# Patient Record
Sex: MALE | Race: WHITE
[De-identification: names, ages, dates, MRNs, and addresses within clinical notes are randomized per-mention and may not be internally consistent; named-entity substitution may affect disease eponyms.]

---

## 2017-12-17 ENCOUNTER — HOSPITAL ENCOUNTER (EMERGENCY)
Dept: HOSPITAL 26 - MED | Age: 52
Discharge: HOME | End: 2017-12-17
Payer: COMMERCIAL

## 2017-12-17 VITALS — SYSTOLIC BLOOD PRESSURE: 130 MMHG | DIASTOLIC BLOOD PRESSURE: 86 MMHG

## 2017-12-17 VITALS — BODY MASS INDEX: 28.61 KG/M2 | WEIGHT: 178 LBS | HEIGHT: 66 IN

## 2017-12-17 VITALS — DIASTOLIC BLOOD PRESSURE: 93 MMHG | SYSTOLIC BLOOD PRESSURE: 121 MMHG

## 2017-12-17 DIAGNOSIS — R55: ICD-10-CM

## 2017-12-17 DIAGNOSIS — M54.12: Primary | ICD-10-CM

## 2017-12-17 DIAGNOSIS — J45.909: ICD-10-CM

## 2017-12-17 DIAGNOSIS — M62.838: ICD-10-CM

## 2017-12-17 PROCEDURE — 99284 EMERGENCY DEPT VISIT MOD MDM: CPT

## 2017-12-17 PROCEDURE — 73030 X-RAY EXAM OF SHOULDER: CPT

## 2017-12-17 PROCEDURE — 93005 ELECTROCARDIOGRAM TRACING: CPT

## 2017-12-17 PROCEDURE — 96372 THER/PROPH/DIAG INJ SC/IM: CPT

## 2020-03-14 ENCOUNTER — HOSPITAL ENCOUNTER (EMERGENCY)
Dept: HOSPITAL 1 - ED | Age: 55
Discharge: HOME | End: 2020-03-14
Payer: COMMERCIAL

## 2020-03-14 VITALS — SYSTOLIC BLOOD PRESSURE: 127 MMHG | DIASTOLIC BLOOD PRESSURE: 90 MMHG

## 2020-03-14 VITALS
BODY MASS INDEX: 27.54 KG/M2 | WEIGHT: 171.37 LBS | BODY MASS INDEX: 27.54 KG/M2 | HEIGHT: 66 IN | HEIGHT: 66 IN | WEIGHT: 171.37 LBS

## 2020-03-14 DIAGNOSIS — Z88.8: ICD-10-CM

## 2020-03-14 DIAGNOSIS — Z88.5: ICD-10-CM

## 2020-03-14 DIAGNOSIS — E11.9: ICD-10-CM

## 2020-03-14 DIAGNOSIS — K59.00: ICD-10-CM

## 2020-03-14 DIAGNOSIS — G43.909: ICD-10-CM

## 2020-03-14 DIAGNOSIS — J45.909: ICD-10-CM

## 2020-03-14 DIAGNOSIS — K21.9: Primary | ICD-10-CM

## 2020-03-14 DIAGNOSIS — I10: ICD-10-CM

## 2020-03-14 LAB
ALBUMIN SERPL-MCNC: 3.4 G/DL (ref 3.4–5)
ALP SERPL-CCNC: 94 U/L (ref 46–116)
ALT SERPL-CCNC: 17 U/L (ref 16–63)
AST SERPL-CCNC: 14 U/L (ref 15–37)
BASOPHILS NFR BLD: 0.5 % (ref 0–2)
BILIRUB SERPL-MCNC: 0.23 MG/DL (ref 0.2–1)
BUN SERPL-MCNC: 17 MG/DL (ref 7–18)
CALCIUM SERPL-MCNC: 8.2 MG/DL (ref 8.5–10.1)
CHLORIDE SERPL-SCNC: 109 MMOL/L (ref 98–107)
CO2 SERPL-SCNC: 21.8 MMOL/L (ref 21–32)
CREAT SERPL-MCNC: 1.1 MG/DL (ref 0.7–1.3)
ERYTHROCYTE [DISTWIDTH] IN BLOOD BY AUTOMATED COUNT: 16.8 % (ref 11.5–14.5)
GFR SERPLBLD BASED ON 1.73 SQ M-ARVRAT: > 60 ML/MIN
GLUCOSE SERPL-MCNC: 118 MG/DL (ref 74–106)
PLATELET # BLD: 243 X10^3MCL (ref 130–400)
POTASSIUM SERPL-SCNC: 3.6 MMOL/L (ref 3.5–5.1)
PROT SERPL-MCNC: 7.1 G/DL (ref 6.4–8.2)
SODIUM SERPL-SCNC: 141 MMOL/L (ref 136–145)

## 2020-10-17 ENCOUNTER — HOSPITAL ENCOUNTER (INPATIENT)
Dept: HOSPITAL 1 - ED | Age: 55
LOS: 3 days | Discharge: HOME | DRG: 233 | End: 2020-10-20
Payer: COMMERCIAL

## 2020-10-17 VITALS
WEIGHT: 170.56 LBS | HEIGHT: 66 IN | HEIGHT: 66 IN | BODY MASS INDEX: 27.41 KG/M2 | BODY MASS INDEX: 27.41 KG/M2 | WEIGHT: 170.56 LBS

## 2020-10-17 VITALS — DIASTOLIC BLOOD PRESSURE: 76 MMHG | SYSTOLIC BLOOD PRESSURE: 114 MMHG

## 2020-10-17 VITALS — SYSTOLIC BLOOD PRESSURE: 121 MMHG | DIASTOLIC BLOOD PRESSURE: 84 MMHG

## 2020-10-17 DIAGNOSIS — Z80.42: ICD-10-CM

## 2020-10-17 DIAGNOSIS — Z79.899: ICD-10-CM

## 2020-10-17 DIAGNOSIS — E87.6: ICD-10-CM

## 2020-10-17 DIAGNOSIS — K35.32: Primary | ICD-10-CM

## 2020-10-17 DIAGNOSIS — Z88.8: ICD-10-CM

## 2020-10-17 DIAGNOSIS — E11.9: ICD-10-CM

## 2020-10-17 DIAGNOSIS — F43.10: ICD-10-CM

## 2020-10-17 DIAGNOSIS — J45.909: ICD-10-CM

## 2020-10-17 DIAGNOSIS — G43.909: ICD-10-CM

## 2020-10-17 DIAGNOSIS — I10: ICD-10-CM

## 2020-10-17 DIAGNOSIS — Z88.5: ICD-10-CM

## 2020-10-17 DIAGNOSIS — F31.9: ICD-10-CM

## 2020-10-17 DIAGNOSIS — Z81.8: ICD-10-CM

## 2020-10-17 LAB
ALBUMIN SERPL-MCNC: 3.6 G/DL (ref 3.4–5)
ALP SERPL-CCNC: 105 U/L (ref 46–116)
ALT SERPL-CCNC: 35 U/L (ref 16–63)
AST SERPL-CCNC: 19 U/L (ref 15–37)
BASOPHILS NFR BLD: 0.1 % (ref 0–2)
BILIRUB SERPL-MCNC: 0.5 MG/DL (ref 0.2–1)
BUN SERPL-MCNC: 11 MG/DL (ref 7–18)
CALCIUM SERPL-MCNC: 8.1 MG/DL (ref 8.5–10.1)
CHLORIDE SERPL-SCNC: 105 MMOL/L (ref 98–107)
CO2 SERPL-SCNC: 20.9 MMOL/L (ref 21–32)
CREAT SERPL-MCNC: 1 MG/DL (ref 0.7–1.3)
ERYTHROCYTE [DISTWIDTH] IN BLOOD BY AUTOMATED COUNT: 19 % (ref 11.5–14.5)
GFR SERPLBLD BASED ON 1.73 SQ M-ARVRAT: > 60 ML/MIN
GLUCOSE SERPL-MCNC: 129 MG/DL (ref 74–106)
LIPASE SERPL-CCNC: 69 IU/L (ref 73–393)
MICROSCOPIC UR-IMP: NO
PLATELET # BLD: 211 X10^3MCL (ref 130–400)
POTASSIUM SERPL-SCNC: 3.6 MMOL/L (ref 3.5–5.1)
PROT SERPL-MCNC: 7.3 G/DL (ref 6.4–8.2)
RBC # UR STRIP.AUTO: NEGATIVE /UL
SODIUM SERPL-SCNC: 139 MMOL/L (ref 136–145)
UA SPECIFIC GRAVITY: 1.01 (ref 1–1.03)

## 2020-10-17 PROCEDURE — G0378 HOSPITAL OBSERVATION PER HR: HCPCS

## 2020-10-17 PROCEDURE — 0DTJ4ZZ RESECTION OF APPENDIX, PERCUTANEOUS ENDOSCOPIC APPROACH: ICD-10-PCS | Performed by: SURGERY

## 2020-10-17 NOTE — NUR
PT ARRIVED TO ED [facility] WITH COMPLAINTS OF RIGHT SIDED ABD PAIN THAT STARTED LAST
NIGHT AT 2000. PT STATES PAIN IS NOT RADIATING AND INCREASES WITH PALPATION. PT
STATES ALSO FELT NAUSEATED LAST NIGHT AND VOMITED X 1 TIME. PT LBM WAS
YESTERDAY AT 1800. PT STATES PAIN AT THIS TIME IS 10/10. PER EMT, PT CALLED
AMBULANCE THIS AM BECAUSE PAIN WAS NOT GOING AWAY. PT WAS GIVEN ZOFRAN 4MG PO
BY FIRE AND PT STATES NOT LONGER FEELING NAUSEATED AT THIS TIME. PT ARRIVED
LOOKING TO BE IN PAIN, PT AMBULATED FROM GURNEY TO BED WITHOUT ASSISTANCE. PT
CHANGED INTO GOWN. RESPIRATIONS EVEN AND UNLABORED ON ROOM AIR. CALL LIGHT
WITHIN REACH. WILL CONTINUE TO MONITOR.

## 2020-10-17 NOTE — NUR
RECEIVED PT FROM DAY SHIFT NURSE. PT A/OX4. ABLE TO MAKE NEEDS KNOWN. SPEECH
CLEAR. ON TELE#16 READING SINUS TACHYCARDIA . PT DENIES CHEST PAIN OR
PRESSURE AT THIS TIME. PULSES PALPABLE. NO EDEMA NOTED. RR EVEN AND UNLABORED
ON RA. PT C/O NAUSEA AND R ABDOMEN PAIN, WILL PAGE DOCTOR FOR NEW ORDERS.
VOIDS FREELY.  NO WEAKNESS NOTED. SKIN INTACT. IV TO R FINGER AND LIJ CENTRAL
LINE INTACT WITH 3 PORTS. CALL LIGHT WITHIN REACH. WILL CONTINUE TO MONITOR.

## 2020-10-17 NOTE — NUR
NEW IV STARTED TO 2ND DIGIT TO LEFT HAND AFTER MULTIPLE ATTEMPTS. 24 GAUGE
FLUSHING FREELY. IV FLUIDS AND ANTIBIOTICS INFUSING AT THIS TIME. WILL CONITNUE
TO MONITOR.

## 2020-10-17 NOTE — NUR
IV FLUIDS NOT INFUSING TO RIGHT EJ, YVONNEITPLE ATTEMPTS TO REPOSITION BY NURSE
AND DR. DOWLING AND UNSUCCESSFUL. PT STATES COMMONLY NEEDS CENTRAL OR PICC LINE.
DR. DOWLING AWARE. AWAITING ULTRASOUND TO ASSIST WITH CENTRAL LINE PLACEMENT

## 2020-10-17 NOTE — NUR
PT WAS RECEIVED BY PRIMARY NURSE RAMÓN FROM ED [facility] AT 1455H. AAOX4.
DENIES HEADACHE/DIZZINESS. ABLE TO FOLLOW COMMANDS. NO SOB NOTED, LUNG SOUNDS
CTA. DENIES CHEST PAIN/PRESSURE, SINUS TACHYCARDIA ON THE MONITOR, HR .
NO EDEMA NOTED. C/O 7/10 RIGHT ABDOMINAL PAIN DESCRIBED AS STABBING AND
NAUSEA, STATED THAT HE WAS VOMITING YESTERDAY. LAST BM=10/16/20 (FORMED).
BOWEL SOUNDS ACTIVE. ABDOMEN IS SOFT AND ROUND. VOIDS. IV SITE PATENT AND
INTACT ON THE LEFT FINGER AND TRIPLE LUMEN CENTRAL LINE ON THE LIJ (W/ GOOD
BLOOD RETURN). SIDE RAILS UPX2. CALL LIGHT ON REACH. ENDORSED TO PRIMARY NURSE
RAMÓN FOR CONTINUITY OF CARE

## 2020-10-17 NOTE — NUR
ULTRASOUND GUIDED CENTRAL LINE PLACEMENT SUCCESSFUL. PT TOLERATED WELL. 
INFORMED [facility] NURSE. AWATING X RAY VERIFICATION

## 2020-10-18 VITALS — DIASTOLIC BLOOD PRESSURE: 86 MMHG | SYSTOLIC BLOOD PRESSURE: 130 MMHG

## 2020-10-18 VITALS — DIASTOLIC BLOOD PRESSURE: 82 MMHG | SYSTOLIC BLOOD PRESSURE: 126 MMHG

## 2020-10-18 VITALS — SYSTOLIC BLOOD PRESSURE: 127 MMHG | DIASTOLIC BLOOD PRESSURE: 81 MMHG

## 2020-10-18 VITALS — SYSTOLIC BLOOD PRESSURE: 131 MMHG | DIASTOLIC BLOOD PRESSURE: 81 MMHG

## 2020-10-18 VITALS — DIASTOLIC BLOOD PRESSURE: 80 MMHG | SYSTOLIC BLOOD PRESSURE: 122 MMHG

## 2020-10-18 VITALS — DIASTOLIC BLOOD PRESSURE: 71 MMHG | SYSTOLIC BLOOD PRESSURE: 158 MMHG

## 2020-10-18 VITALS — SYSTOLIC BLOOD PRESSURE: 123 MMHG | DIASTOLIC BLOOD PRESSURE: 83 MMHG

## 2020-10-18 VITALS — SYSTOLIC BLOOD PRESSURE: 113 MMHG | DIASTOLIC BLOOD PRESSURE: 82 MMHG

## 2020-10-18 VITALS — SYSTOLIC BLOOD PRESSURE: 122 MMHG | DIASTOLIC BLOOD PRESSURE: 83 MMHG

## 2020-10-18 VITALS — SYSTOLIC BLOOD PRESSURE: 139 MMHG | DIASTOLIC BLOOD PRESSURE: 93 MMHG

## 2020-10-18 VITALS — SYSTOLIC BLOOD PRESSURE: 119 MMHG | DIASTOLIC BLOOD PRESSURE: 80 MMHG

## 2020-10-18 LAB
BASOPHILS NFR BLD: 0.1 % (ref 0–2)
BUN SERPL-MCNC: 14 MG/DL (ref 7–18)
CALCIUM SERPL-MCNC: 7.3 MG/DL (ref 8.5–10.1)
CHLORIDE SERPL-SCNC: 108 MMOL/L (ref 98–107)
CO2 SERPL-SCNC: 23.5 MMOL/L (ref 21–32)
CREAT SERPL-MCNC: 1.1 MG/DL (ref 0.7–1.3)
ERYTHROCYTE [DISTWIDTH] IN BLOOD BY AUTOMATED COUNT: 19.4 % (ref 11.5–14.5)
GFR SERPLBLD BASED ON 1.73 SQ M-ARVRAT: > 60 ML/MIN
GLUCOSE SERPL-MCNC: 137 MG/DL (ref 74–106)
MAGNESIUM SERPL-MCNC: 2.1 MG/DL (ref 1.8–2.4)
PHOSPHATE SERPL-MCNC: 1.9 MG/DL (ref 2.5–4.9)
PLATELET # BLD: 191 X10^3MCL (ref 130–400)
POTASSIUM SERPL-SCNC: 3.9 MMOL/L (ref 3.5–5.1)
SODIUM SERPL-SCNC: 140 MMOL/L (ref 136–145)

## 2020-10-18 NOTE — NUR
REPORT TAKEN FROM NIGHT SHIFT NURSE AT THE BEDSIDE, PATIENT FOUND TO BE AWAKE
AND ALERT. PATIENT DENIED ACUTE DISTRESS AT THIS TIME, I OBSERVED 2 LARGE
DRESSING TO ABD, SOME DRAINAGE NOTED TO RIGHT ABD DRESSING, BUT NOT SOILED
THROUGH. CENTRAL LINE TO LEFT IJ INTACT AND SECURED. WILL CONTINUE TO MONITOR

## 2020-10-18 NOTE — NUR
PT RESTING IN BED AT THIS TIME. NO SIGNS OF ACUTE DISTRESS NOTED. RR EVEN AND
UNLABORED ON RA. PT C/O 7/10 PAIN, MEDICATED PER MAR. PT HAS 3 INCISIONS TO
ABD, COVERED WITH DRSG. ALL NEEDS/CONCERNS ADDRESSED THROUGHOUT THE SHIFT.
CALL LIGHT WITHIN REACH. WILL ENDORSE CARE TO ONCOMING SHIFT NURSE.

## 2020-10-18 NOTE — NUR
RECEIVED PT BACK FROM OR. PT HAD A LAPAROSCOPIC APPENDECTOMY SURGERY. PT HAS 3
ABD INCISIONS WITH SUTURES AND STAPLES CYN. PT REPORTED THAT HE IS DIABETIC,
RECENT . A/OX4. ABLE TO MAKE NEEDS KNOWN. VITAL SIGNS UPON ARRIVAL: BP
122/80 (94), , OXYGEN 92 ON RA, TEMP 99.1, RR 16, PAIN 3/10. PT STABLE.
WILL CONTINUE TO MONITOR.

## 2020-10-18 NOTE — NUR
RECEIVED REPORT FROM DAY SHIFT NURSE. PT SEEN GETTING OUT OF BED TO USE THE
RESTROOM. A/OX4. ABLE TO MAKE NEEDS KNOWN. SPEECH CLEAR. ON TELE#16 READING
SINUS TACHYCARDIA . PT DENIES CHEST PAIN OR PRESSURE. RR EVEN AND
UNLABORED ON RA. PT DENIES SOB OR DIFFICULTY BREATHING. PT REPORTED SMALL SOFT
BOWEL MOVEMENT TODAY. VOIDS FREELY. AMBULATORY. PT HAS 3 ABD INCISION WITH
SUTURES AND KACI, DRG CDI. NO C/O PAIN OR DISCOMFORT AT THIS TIME. IV
CENTRAL LINE TO LIJ, PATENT AND REINFORCED WITH TAPE. CALL LIGHT WITHIN REACH.
BED IN LOWEST POSITION. WILL CONTINUE TO MONITOR.

## 2020-10-19 VITALS — DIASTOLIC BLOOD PRESSURE: 93 MMHG | SYSTOLIC BLOOD PRESSURE: 142 MMHG

## 2020-10-19 VITALS — SYSTOLIC BLOOD PRESSURE: 152 MMHG | DIASTOLIC BLOOD PRESSURE: 95 MMHG

## 2020-10-19 VITALS — DIASTOLIC BLOOD PRESSURE: 102 MMHG | SYSTOLIC BLOOD PRESSURE: 163 MMHG

## 2020-10-19 VITALS — SYSTOLIC BLOOD PRESSURE: 143 MMHG | DIASTOLIC BLOOD PRESSURE: 98 MMHG

## 2020-10-19 VITALS — SYSTOLIC BLOOD PRESSURE: 148 MMHG | DIASTOLIC BLOOD PRESSURE: 98 MMHG

## 2020-10-19 VITALS — DIASTOLIC BLOOD PRESSURE: 88 MMHG | SYSTOLIC BLOOD PRESSURE: 139 MMHG

## 2020-10-19 VITALS — SYSTOLIC BLOOD PRESSURE: 122 MMHG | DIASTOLIC BLOOD PRESSURE: 88 MMHG

## 2020-10-19 LAB
BASOPHILS NFR BLD: 0.3 % (ref 0–2)
BUN SERPL-MCNC: 7 MG/DL (ref 7–18)
CALCIUM SERPL-MCNC: 7.9 MG/DL (ref 8.5–10.1)
CHLORIDE SERPL-SCNC: 106 MMOL/L (ref 98–107)
CO2 SERPL-SCNC: 23.3 MMOL/L (ref 21–32)
CREAT SERPL-MCNC: 0.8 MG/DL (ref 0.7–1.3)
ERYTHROCYTE [DISTWIDTH] IN BLOOD BY AUTOMATED COUNT: 19.4 % (ref 11.5–14.5)
GFR SERPLBLD BASED ON 1.73 SQ M-ARVRAT: > 60 ML/MIN
GLUCOSE SERPL-MCNC: 100 MG/DL (ref 74–106)
MAGNESIUM SERPL-MCNC: 1.8 MG/DL (ref 1.8–2.4)
PHOSPHATE SERPL-MCNC: 1.7 MG/DL (ref 2.5–4.9)
PLATELET # BLD: 169 X10^3MCL (ref 130–400)
POTASSIUM SERPL-SCNC: 3 MMOL/L (ref 3.5–5.1)
SODIUM SERPL-SCNC: 135 MMOL/L (ref 136–145)

## 2020-10-19 NOTE — NUR
Patient c/o abd pain at surgical sites, dilaudid givenx2 with moderate relief.
Dr. Guevara and Dr. Pitt made aware of uncontrolled pain with IV meds. Warm
packs given to facilitate pain management. Unable to advance diet from clear
liquids, patient complaining of nausea, zofran givenx1 on this shift. K3.0,
covered with 20meq Kcl IV. Per pharmacy, Hugo,
hold second bag prn order after labs
drawn tomorrow am. Phos 1.7, sodium phosphate IV given as well. Dressings
intact. All needs tended to

## 2020-10-19 NOTE — NUR
PT ASLEEP IN BED AT THIS TIME. NO SIGNS OF ACUTE DISTRESS NOTED. CALL LIGHT
WITHIN REACH. WILL CONTINUE TO MONITOR.

## 2020-10-19 NOTE — NUR
PT ASLEEP AT THIS TIME, EASILY AROUSABLE. PT C/O PAIN AT THIS TIME, MEDICATED
PER MAR. NO SIGNS OF ACUTE DISTRESS NOTED. VITAL SIGNS WNL. WILL ENDORSE CARE
TO ONCOMING SHIFT NURSE.

## 2020-10-19 NOTE — NUR
Assumed care from night shift nurse. Patient is asleep at change of shift. No
signs of pain or distress, fall and safety precautions in place. Hourly
rounding initiated, will continue to monitor

## 2020-10-19 NOTE — NUR
PATIENT RESTING IN BED, A/O X4, VERBALLY RESPONSIVE. BREATHING E/U ON ROOM
AIR, SPO2 98%, DENIES SOB. TELE #16, SINUS RHYTHM, HR 91, DENIES CHEST PAIN.
ABD SOFT AND ROUND, S/P LAP APPENDECTOMY, X2 GAUZE BANDAGES, CDI, C/O OF 8/10
ABD PAIN, WILL GIVE DILAUDID PRN PER EMAR. C/O OF NAUSEA, DENIES VOMITTING,
PROVIDED EMESIS BAG @ BEDSIDE AND WILL GIVE ZOFRAN PRN. NO EDEMA NOTED.
CENTRAL LINE TO [facility], INTACT, FLUSHED AND INFUSING NS @ 80 ML/HR. CALL LIGHT
WITHIN REACH, BED IN LOWEST POSITION. WILL CONTINUE TO MONITOR.

## 2020-10-20 VITALS — SYSTOLIC BLOOD PRESSURE: 150 MMHG | DIASTOLIC BLOOD PRESSURE: 102 MMHG

## 2020-10-20 VITALS — DIASTOLIC BLOOD PRESSURE: 102 MMHG | SYSTOLIC BLOOD PRESSURE: 150 MMHG

## 2020-10-20 VITALS — SYSTOLIC BLOOD PRESSURE: 154 MMHG | DIASTOLIC BLOOD PRESSURE: 96 MMHG

## 2020-10-20 VITALS — SYSTOLIC BLOOD PRESSURE: 147 MMHG | DIASTOLIC BLOOD PRESSURE: 97 MMHG

## 2020-10-20 LAB
BASOPHILS NFR BLD: 0.1 % (ref 0–2)
BUN SERPL-MCNC: 6 MG/DL (ref 7–18)
CALCIUM SERPL-MCNC: 7.6 MG/DL (ref 8.5–10.1)
CHLORIDE SERPL-SCNC: 102 MMOL/L (ref 98–107)
CO2 SERPL-SCNC: 24.9 MMOL/L (ref 21–32)
CREAT SERPL-MCNC: 0.8 MG/DL (ref 0.7–1.3)
ERYTHROCYTE [DISTWIDTH] IN BLOOD BY AUTOMATED COUNT: 18.6 % (ref 11.5–14.5)
GFR SERPLBLD BASED ON 1.73 SQ M-ARVRAT: > 60 ML/MIN
GLUCOSE SERPL-MCNC: 114 MG/DL (ref 74–106)
MAGNESIUM SERPL-MCNC: 1.8 MG/DL (ref 1.8–2.4)
PHOSPHATE SERPL-MCNC: 2.4 MG/DL (ref 2.5–4.9)
PLATELET # BLD: 179 X10^3MCL (ref 130–400)
POTASSIUM SERPL-SCNC: 2.9 MMOL/L (ref 3.5–5.1)
SODIUM SERPL-SCNC: 138 MMOL/L (ref 136–145)

## 2020-10-20 NOTE — NUR
Initial Nutrition Assessment: 251 AMBIKA WILKES 55M HR       Admitted:
10/17
 
 
Dx: Appendicitis
PMHx: DM, ulcerative colitis, IBS-D, esophageal varices, asthma, Bipolar
disorder, migraine
PSHx: R Shoulder repair x 7, Craniotomy at 18yo, B/L wrist carpal tunnel
release
Labs: (10/20) K 2.9L, BG 114H, BUN 6.0L, CA 7.6L, PHOS 2.4L, RBC 3.43L,
H/H/10.1L/31L, NEUTR% 85.0H, LYPNH% 7.7L * HbA1C 5.3 (03/21/18)
Meds: Hydromorphone, Zofran, Ambien, Calcium Gluconate, Toradol, Humulin,
Dextrose 50%, Potassium Chlo, Magnesium
Diet: Clear liquid
PO intake since admission: (10/18) D: 65%, (10/19) D: 75%, Average 70% x 2
meals
Ht: 167.6 cm/66 in Wt: 77.36 kg/170.9 lbs BMI: 27.5 kg/m2 Bed scale: pt was
sitting on a chair
IBW: 64.5 kg/142 lbs %IBW: 120% UBW: 84 kg/185 lbs (8 years ago)
Age: 56 y/o
Food Allergies: none per pt
Edema: no edema
Last BM: 10/18 per pt
Skin: ab surgical dressing is CDI
Avelino: 19
 
Per H and P (10/17) This is a 55 years old  male who is here
complaining of having right lower quadrant abdominal pain started since last
night.  Patient stated he is also having problem with low back pain (chronic).
 He also feeling some referred pain that rating down to the right scrotum.  He
denied of having any similar symptoms like this in the past.  No history of
any fever, recent abdominal trauma, diarrhea, constipation, gross hematuria or
any unusual rash.  In the ER, abdominal CT was done and shows acute
appendicitis.  Initial lab work-up shows unremarkable CBC and BMP.  Thus,
surgery was called and plan for likely operative procedure.  Patient agreeable
and amenable to plan. Pt was admitted with dx: Appendicitis, Right lower
quadrant abdominal pain, Nausea
 
RD Note (10/20)
Per progress note (10/20) Noted Hypokalemia, remains afebrile, vitals stable.
During visit, pt was sitting on a chair. He was awake, alert, verbally
responsive, oriented in person, time, and place. Per pt, his appetite was fair
d/t he still experiencing abd pain. Pt denied any chewing or swallowing
difficulties. Pt denied any recent weight changes, and stated his UBW was 185
lbs (8 years ago). Pt's food tray was observed on his bedside table, and pt
stated, he will eat his food. Additionally, pt takes vitamin D at home. Per
pt, he walks everyday per 40 minutes. Also, pt stated that he has been
diabetic for 8 years, and he is aware of eating carbohydrates in moderation to
control BG. Per pt, he has knowledge about nutritional label reading for
carbohydrates.
 
Problem with:  N/V/D/C: none per pt
Problems with: Chewing:  Swallowing:  none per pt
Current appetite: moderate d/t abd pain
Recent wt change: none per pt  %wt change: none per pt
Height: 66 in per pt
Vitamin/Supplement use: Vitamin D
Special diet at home: none per pt, but he is aware of eating carbohydrates in
moderation/portions
Physical activity: walks 40 minutes everyday
Nutrition education given (specify specific nutrition education and handout
given): Diabetes education was provided to pt. Explained carbohydrate content
in different food and reinforced pt to follow the portions sizes for
carbohydrate counting. Also, pt received nutrition reinforcement about the
importance of reading food labels to consume carbohydrates in
moderation/portions. Written education "carbohydrate Counting for People with
Diabetes" and "Diabetes Label Reading Tips" were provided to pt and he
acknowledged understanding.
 
Food-drug interactions? Education given? n/a
 
Estimated Nutritional Needs Based on ideal body weight (65 kg)
Energy: 1625 - 1950 kcal/day (25 - 30 kcal/kg for maintenance)
Protein: 78 - 98 g/day (1.2-1.5 g/kg s/p surgery)
Fluid: 1625 - 1950 mL/day (1 mL/kcal)
 
Nutrition Diagnosis:
1. Increased energy and protein needs r/t infection and wound healing a/e/b pt
was admitted for appendicitis and s/p laparoscopic appendectomy.
2. Inadequate energy and protein intake r/t poor PO intake a/e/b pt has been
in NPO and clear liquid diet for 3 days and pt consumes < 75% if estimated
energy and protein needs.
 
Intervention
1. Recommend continue with clear liquid diet until pt receives medical
approval to advance diet as appropriate.
2. Recommend CCHO 60 g diet when pt is able to tolerate solid foods.
 
Monitor/Evaluate
Goal: PO intake at least 75% of estimated needs
Monitor: PO intake, Labs, GI function
F/U in 2-3 days as high risk 10/22-23

## 2020-10-20 NOTE — NUR
PATIENT STILL COMPLAINS OF BREAKTHROUGH PAIN AT THIS TIME. SPOKE TO DR. GRAY OF PATIENT STATUS. MD STATED TO CALL DR. CABELLO TO NOTIFY HIM OF PATIENT
STILL HAVING BREAKTHROUGH PAIN DESPITE BEING ADMINSITERED DILAUDID AT THIS
TIME. WILL CONTINUE TO MONITOR PATIENT AND WILL CONTACT DR. CABELLO'S OFFICE.

## 2020-10-20 NOTE — NUR
PATIENT RESTING IN BED WITH EYES CLOSED. EVEN CHEST RISE AND FALL, ON ROOM
AIR. TELE #16, HR 98, SINUS RHYTHM. GAVE DILAUDID PRN FOR 8/10 ABD PAIN.
SHOWS NO SING OF PAIN OR DISTRESS. DRAINED 150 ML OF YELLOW URINE FROM URINAL.
CALL LIGHT WITHIN REACH, BED IN LOWEST POSITION. WILL CONTINUE TO MONITOR.

## 2020-10-20 NOTE — NUR
PATIENT RECIEVED DISCHARGE INSTRUCTIONS. ALL QUESTIONS AND CONCERNS HAVE BEEN
ADDRESSED. LEFT IJ CENTRAL LINE CATHETER HAS BEEN DC AND INTACT. PATIENT
STATES HIS MOTHER WILL PICK HIM UP. WILL CONTINUE TO MONITOR WHILE WAITING FOR
PATIENT TO ARRIVE.

## 2020-10-20 NOTE — NUR
PATIENT WAS LAYING IN BED AT THIS TIME, NORMAL IN APPEARANCE. PATIENT IS
A&OX4, FOLLOWS COMMANDS AND COOPERATES WELL. TELE PATIENT, DENIES CHEST PAIN.
LUNG SOUNDS CTA BILATERALLY, ON RA, O2 SAT 98%, DENIES SOB. NORMOACTIVE BSX4,
ABD SOFT AND TENDER, STATES ABD PAIN. VOIDS USING RESTROOM, AMBULATORY WITH
GENERALIZED WEAKNESS. CENTRAL LINE DRESSING IS CDI AND INFUSING AT THIS TIME.
DENIES ANY OTHER PAIN OR DISCOMFORT AT THIS TIME. WILL CONTINUE TO MONITOR
PATIENT AND ADMINISTER PAIN MEDICATION AS NEEDED.

## 2020-10-20 NOTE — NUR
PATIENT RESTING IN BED WITH EYES CLOSED. EVEN CHEST RISE AND FALL, ON ROOM
AIR, SPO2 98%. TELE #16, SINUS TACHY, . SHOWS NO SIGN OF PAIN OR
DISTRESS. S/P LAP APPENDECTOMY, GAUZE BANDAGES X2, CDI. CENTRAL LINE LIJ
INTACT AND INFUSING NS @ 80 ML/HR. CALL LIGHT WITHIN REACH, NO SIGNIFICANT
CHANGES DURING SHIFT, WILL ENDORSE CARE TO DAY NURSE.

## 2020-10-20 NOTE — NUR
DISCUSSED RESULTS WITH DR. CABELLO. DR. CABELLO STATES THAT IF PATIENT TOLERATES, TO
ADVANCE DIET. OTHERWISE, MD STATES THAT PATIENT CAN BE DISCAHRGED TODAY.
DISCUSSED MD'S ORDERS WITH PATIENT. ALL QUESTIONS AND CONCERNS HAVE BEEN
ADDRESSED. PATIENT AGREES TO DISCAHRGE.

## 2020-10-20 NOTE — NUR
1. Recommend continue with clear liquid diet until pt receives medical
approval to advance diet as appropriate.
2. Recommend CCHO 60 g diet when pt is able to tolerate solid foods

## 2020-10-20 NOTE — NUR
SPOKE TO DR. CABELLO ABOUT PATIENT STATUS AND HIS CONSTANT BREAKTHROUGH OF PAIN.
DR. CABELLO VISITED AND ASSESSED PATIENT. MD STATED THAT HE WILL ORDER A CT SCAN OF
THE ABD W/O CONTRAST. NO FURTHER ORDERS AT THIS TIME.

## 2020-10-22 ENCOUNTER — HOSPITAL ENCOUNTER (EMERGENCY)
Dept: HOSPITAL 1 - ED | Age: 55
Discharge: OTHER INSTITUTION | End: 2020-10-22
Payer: COMMERCIAL

## 2020-10-22 VITALS — HEIGHT: 66 IN | BODY MASS INDEX: 24.43 KG/M2 | WEIGHT: 152 LBS

## 2020-10-22 VITALS — SYSTOLIC BLOOD PRESSURE: 140 MMHG | DIASTOLIC BLOOD PRESSURE: 94 MMHG

## 2020-10-22 DIAGNOSIS — J45.909: ICD-10-CM

## 2020-10-22 DIAGNOSIS — Z02.89: Primary | ICD-10-CM

## 2020-10-22 DIAGNOSIS — G43.909: ICD-10-CM

## 2020-10-22 DIAGNOSIS — Z90.89: ICD-10-CM

## 2020-10-22 DIAGNOSIS — E11.9: ICD-10-CM

## 2020-10-22 DIAGNOSIS — Z88.5: ICD-10-CM

## 2020-10-22 DIAGNOSIS — M25.511: ICD-10-CM

## 2020-10-22 DIAGNOSIS — R10.9: Primary | ICD-10-CM

## 2020-10-22 DIAGNOSIS — Z88.8: ICD-10-CM

## 2020-10-22 DIAGNOSIS — I10: ICD-10-CM

## 2020-10-22 DIAGNOSIS — G89.29: ICD-10-CM
